# Patient Record
Sex: FEMALE | URBAN - METROPOLITAN AREA
[De-identification: names, ages, dates, MRNs, and addresses within clinical notes are randomized per-mention and may not be internally consistent; named-entity substitution may affect disease eponyms.]

---

## 2020-03-04 ENCOUNTER — APPOINTMENT (RX ONLY)
Dept: URBAN - METROPOLITAN AREA CLINIC 15 | Facility: CLINIC | Age: 35
Setting detail: DERMATOLOGY
End: 2020-03-04

## 2020-03-04 DIAGNOSIS — Q828 OTHER SPECIFIED ANOMALIES OF SKIN: ICD-10-CM

## 2020-03-04 DIAGNOSIS — Q819 OTHER SPECIFIED ANOMALIES OF SKIN: ICD-10-CM

## 2020-03-04 DIAGNOSIS — L71.8 OTHER ROSACEA: ICD-10-CM

## 2020-03-04 DIAGNOSIS — L40.0 PSORIASIS VULGARIS: ICD-10-CM

## 2020-03-04 DIAGNOSIS — Q826 OTHER SPECIFIED ANOMALIES OF SKIN: ICD-10-CM

## 2020-03-04 DIAGNOSIS — D22 MELANOCYTIC NEVI: ICD-10-CM

## 2020-03-04 PROBLEM — D22.5 MELANOCYTIC NEVI OF TRUNK: Status: ACTIVE | Noted: 2020-03-04

## 2020-03-04 PROBLEM — D23.71 OTHER BENIGN NEOPLASM OF SKIN OF RIGHT LOWER LIMB, INCLUDING HIP: Status: ACTIVE | Noted: 2020-03-04

## 2020-03-04 PROBLEM — Q82.8 OTHER SPECIFIED CONGENITAL MALFORMATIONS OF SKIN: Status: ACTIVE | Noted: 2020-03-04

## 2020-03-04 PROCEDURE — ? COUNSELING

## 2020-03-04 PROCEDURE — 99203 OFFICE O/P NEW LOW 30 MIN: CPT

## 2020-03-04 PROCEDURE — ? TREATMENT REGIMEN

## 2020-03-04 PROCEDURE — ? PRESCRIPTION

## 2020-03-04 RX ORDER — METRONIDAZOLE 7.5 MG/G
CREAM TOPICAL QOHS
Qty: 1 | Refills: 4 | Status: ERX | COMMUNITY
Start: 2020-03-04

## 2020-03-04 RX ORDER — KETOCONAZOLE 20 MG/ML
1 SHAMPOO TOPICAL
Qty: 1 | Refills: 2 | Status: ERX | COMMUNITY
Start: 2020-03-04

## 2020-03-04 RX ORDER — BETAMETHASONE DIPROPIONATE 0.5 MG/G
1 CREAM TOPICAL BID
Qty: 1 | Refills: 1 | Status: ERX | COMMUNITY
Start: 2020-03-04

## 2020-03-04 RX ORDER — CLOBETASOL PROPIONATE 0.5 MG/ML
1 SOLUTION TOPICAL
Qty: 1 | Refills: 2 | Status: ERX | COMMUNITY
Start: 2020-03-04

## 2020-03-04 RX ORDER — CALCIPOTRIENE 0.05 MG/G
1 CREAM TOPICAL BID
Qty: 1 | Refills: 1 | Status: ERX | COMMUNITY
Start: 2020-03-04

## 2020-03-04 RX ORDER — AZELAIC ACID 0.15 G/G
1 GEL TOPICAL QAM
Qty: 1 | Refills: 2 | Status: ERX | COMMUNITY
Start: 2020-03-04

## 2020-03-04 RX ADMIN — CALCIPOTRIENE 1: 0.05 CREAM TOPICAL at 00:00

## 2020-03-04 RX ADMIN — AZELAIC ACID 1: 0.15 GEL TOPICAL at 00:00

## 2020-03-04 RX ADMIN — CLOBETASOL PROPIONATE 1: 0.5 SOLUTION TOPICAL at 00:00

## 2020-03-04 RX ADMIN — KETOCONAZOLE 1: 20 SHAMPOO TOPICAL at 00:00

## 2020-03-04 RX ADMIN — METRONIDAZOLE: 7.5 CREAM TOPICAL at 00:00

## 2020-03-04 RX ADMIN — BETAMETHASONE DIPROPIONATE 1: 0.5 CREAM TOPICAL at 00:00

## 2020-03-04 ASSESSMENT — LOCATION DETAILED DESCRIPTION DERM
LOCATION DETAILED: LEFT INFERIOR OCCIPITAL SCALP
LOCATION DETAILED: RIGHT PROXIMAL POSTERIOR UPPER ARM
LOCATION DETAILED: PERIUMBILICAL SKIN
LOCATION DETAILED: RIGHT SUPERIOR POSTERIOR NECK
LOCATION DETAILED: LEFT INFERIOR CENTRAL MALAR CHEEK
LOCATION DETAILED: LEFT PROXIMAL POSTERIOR UPPER ARM
LOCATION DETAILED: RIGHT INFERIOR CENTRAL MALAR CHEEK

## 2020-03-04 ASSESSMENT — LOCATION SIMPLE DESCRIPTION DERM
LOCATION SIMPLE: RIGHT POSTERIOR UPPER ARM
LOCATION SIMPLE: RIGHT CHEEK
LOCATION SIMPLE: POSTERIOR SCALP
LOCATION SIMPLE: ABDOMEN
LOCATION SIMPLE: LEFT CHEEK
LOCATION SIMPLE: LEFT POSTERIOR UPPER ARM
LOCATION SIMPLE: POSTERIOR NECK

## 2020-03-04 ASSESSMENT — LOCATION ZONE DERM
LOCATION ZONE: NECK
LOCATION ZONE: SCALP
LOCATION ZONE: FACE
LOCATION ZONE: TRUNK
LOCATION ZONE: ARM

## 2020-03-04 NOTE — PROCEDURE: MIPS QUALITY
Detail Level: Detailed
Quality 130: Documentation Of Current Medications In The Medical Record: Eligible clinician attests to documenting in the medical record the patient is not eligible for a current list of medications being obtained, updated, or reviewed by the eligible clinician
Additional Notes: Not on any medication

## 2020-03-04 NOTE — PROCEDURE: TREATMENT REGIMEN
Action 2: Continue
Detail Level: Generalized
Start Regimen: Koby Prom \\nClobetasol 0.05% scalp solution. Apply to scalp at night, 3 times a week. Leave it for 6 to 8 hours. Then, wash it off the next day. \\n\\nKetoconazole 2% shampoo. To be used twice or 3 times a week. Apply to dry scalp and leave for 5 to 10 minutes . Then wash it off.  \\nAlternate with Neutrogena TSal shampoo( Over the Counter) \\n\\nEARS AND BODY\\nMix Betamethasone and Calcipotriene cream and apply it twice daily
Other Instructions: Brittney Musa \\Davontralesional kenalog \\nPatient is breastfeeding and she was advised to pump and dump for 1 day before the procedure.
Initiate Treatment: Quincy Kras \\nWash face with a Gentle Cleanser every morning. \\nAzelaic JM 15% gel every morning \\nSunscreen daily ( Mineral: Zinc Oxide and Titanium Dioxide with SPF 30-50). \\n\\nNight. \\nWash face with a Gentle Cleanser at night. \\nMetronidazole 0.75% cream at night.
Detail Level: Zone